# Patient Record
Sex: MALE | Race: WHITE
[De-identification: names, ages, dates, MRNs, and addresses within clinical notes are randomized per-mention and may not be internally consistent; named-entity substitution may affect disease eponyms.]

---

## 2017-08-22 ENCOUNTER — HOSPITAL ENCOUNTER (EMERGENCY)
Dept: HOSPITAL 69 - ER | Age: 32
Discharge: HOME | End: 2017-08-22
Payer: SELF-PAY

## 2017-08-22 VITALS — SYSTOLIC BLOOD PRESSURE: 127 MMHG | DIASTOLIC BLOOD PRESSURE: 57 MMHG

## 2017-08-22 DIAGNOSIS — B88.0: Primary | ICD-10-CM

## 2017-08-22 NOTE — ERNOTE
Integumentary HPI





- Narrative


Date of Service: 08/22/17





- General


Time Seen by Provider: 08/22/17 19:10





- Immun/Allergies/Home Medications


Immunizations: 





 IMMUNIZATION HX





Immunizations Up to Date         Yes


History of Influenza Vaccine     No


Hx Pneumococcal Vaccination      No








Allergies/Adverse Reactions: 


 Allergies











Allergy/AdvReac Type Severity Reaction Status Date / Time


 


No Known Allergies Allergy   Verified 08/22/17 19:02











Home Medications: 


HOME MEDICATIONS





NK [No Home Medication]  12/08/13 [Last Taken Unknown]











- History of Present Illness


Narrative: 





Pt. comes in with c/o rash on his L hand and R wrist for two days.  Pt. denies 

any SOB, CP, swelling, fever, rhinorrhea, sore throat, any pain but states that 

the rash itches.  Pt. denies any alleviating factors, aggravating factors, or 

prehospital treatment.





Review of Systems





- Review of Systems


Constitutional: Present: no symptoms reported


EYE: Present: no symptoms reported


ENT: Present: no symptoms reported


Respiratory: Present: no symptoms reported.  Absent: shortness of breath, cough

, wheezing


Cardiology: Present: no symptoms reported.  Absent: chest pain, palpitations, 

edema


Musculoskeletal: Present: no symptoms reported.  Absent: back pain, joint pain


Skin: Present: rash


Neurological: Present: no symptoms reported.  Absent: headache, dizziness/light-

headedness, numbness, tingling


All Other Systems: All systems neg except as marked





- Patient's Past Medical History


Patient History - Medical: No pertinent hx


Patient History - Cardiac/Respiratory: No pertinent hx


Patient History - Cancer: No Hx of Cancer


Patient History - Surgical Procedures: No surgical history


Patient History - Other: None





- Family History


  ** Father


Family History - Medical: Diabetes Type 2 Insulin Dependent


Family History - Cardiac/Respiratory: No pertinent hx


Family History - Cancer: No pertinent family hx





- Social History


Living Situations: home


Abuse History: No History of abuse


Psych History: No pertinent hx


Smoking Status: Never smoker


Have you smoked in the past 12 months: No


Do you dip or chew tobacco: No


Patient requests Smoking Cessation Consult: No


Initiate information on Smoking Cessation: No


Alcohol Use: none


Drug Use: none





- Immunizations


Immunizations Up to Date: Yes


Hx Pneumococcal Vaccination: No


History of Influenza Vaccine: No





Physical Exam





- Physical Exam


General Appearance: Present: wd/wn, alert, no apparent distress


Head Exam: Present: normal inspection, no evidence of injury


Eye Exam: Normal inspection: bilateral, PERRL: bilateral, EOMI: bilateral


Ears, Nose, Throat: Present: normal ENT inspection, normal pharynx


Respiratory: Present: no respiratory distress, normal breath sounds, no 

accessory muscle use, chest nontender, lungs clear


Cardiovascular/Chest: Present: regular rate, rhythm, no murmur, normal 

peripheral pulses


Extremity Exam: Present: non-tender, normal range of motion, no edema


Neurological Exam: Present: alert, oriented, normal mood/affect, no motor/

sensory deficits


Skin Exam: Present: normal color, warm/dry, other - two small insect bites L 

hand and Three on R wrist with mild erythema no edema and healing 





ED Progress





- Vital Signs


Patient's Vital Signs:: I have reviewed the patient's vital signs.


Vital Signs: 





 Vital Signs











  08/22/17





  19:02


 


Temperature 37.1 C


 


Pulse Rate 76


 


Respiratory 14





Rate 


 


Blood Pressure 127/57


 


O2 Sat by Pulse 99





Oximetry 














- Progress/Reassessment


Chief Complaint: Rash





Departure


Clinical Impression: 


 Chigger bites








- Departure


Disposition: Home self-care


Condition: Good


Instructions:  Insect Bite


Additional Instructions: 


Please follow up with primary provider in 2-3 days if not improved please take 

50 mg Benedryl then take 25 mg every six hours after.

## 2018-02-25 ENCOUNTER — HOSPITAL ENCOUNTER (EMERGENCY)
Dept: HOSPITAL 69 - ER | Age: 33
Discharge: HOME | End: 2018-02-25
Payer: SELF-PAY

## 2018-02-25 VITALS — DIASTOLIC BLOOD PRESSURE: 70 MMHG | SYSTOLIC BLOOD PRESSURE: 133 MMHG

## 2018-02-25 NOTE — ERNOTE
Medical Problem HPI





- Narrative


Date of Service: 02/25/18





- General


Chief Complaint: Fever


Time Seen by Provider: 02/25/18 07:01


Source: patient


Exam Limitations: no limitations





- Immun/Allergies/Home Medications


Immunizations: 





 IMMUNIZATION HX





Immunizations Up to Date         Yes


History of Influenza Vaccine     No


Hx Pneumococcal Vaccination      No








Allergies/Adverse Reactions: 


Allergies





No Known Allergies Allergy (Verified 02/25/18 06:58)


 








Home Medications: 


HOME MEDICATIONS





Doxycycline Hyclate [Vibratab] 100 mg PO BID #20 tab 02/25/18 [Last Taken 

Unknown]











- History of Present History


Narrative: 





c/o fever and sinus pain


Timing: constant, getting worse


Severity: moderate


Modifying Factors - (Improves): Present: other - nothing


Modifying Factors - (Worsens): Present: other - nothing





Review of Systems





- Narrative


Narrative: 





unremarkable





- Review of Systems


Constitutional: Present: See HPI, weakness, fatigue, malaise


EYE: Present: no symptoms reported


ENT: Present: See HPI, nose pain, nose congestion, nasal drainage, other - left 

sinus pain


Respiratory: Present: no symptoms reported


Cardiology: Present: no symptoms reported


Gastrointestinal/Abdominal: Present: no symptoms reported


Genitourinary: Present: no symptoms reported


Musculoskeletal: Present: no symptoms reported


Skin: Present: no symptoms reported


Neurological: Present: no symptoms reported


Endocrine: Present: no symptoms reported


Hematologic/Lymphatic: Present: no symptoms reported





- Narrative


Narrative: 





unremarkable





- Patient's Past Medical History


Patient History - Medical: No pertinent hx


Patient History - Cardiac/Respiratory: No pertinent hx


Patient History - Cancer: No Hx of Cancer


Patient History - Surgical Procedures: No surgical history


Patient History - Other: None





- Family History


  ** Father


Family History - Medical: Diabetes Type 2 Insulin Dependent


Family History - Cardiac/Respiratory: No pertinent hx


Family History - Cancer: No pertinent family hx





- Social History


Living Situations: home


Abuse History: No History of abuse


Psych History: No pertinent hx


Does anyone smoke in the home?: No


Smoking Status: Never smoker


Have you smoked in the past 12 months: No


Do you dip or chew tobacco: No


Patient requests Smoking Cessation Consult: No


Initiate information on Smoking Cessation: No


Alcohol Use: none


Drug Use: none





- Immunizations


Immunizations Up to Date: Yes


Hx Pneumococcal Vaccination: No


History of Influenza Vaccine: No





Physical Exam





- Physical Exam


General Appearance: Present: mild distress, anxious


Head Exam: Present: normal inspection, no evidence of injury


Eye Exam: Normal inspection: bilateral, PERRL: bilateral, EOMI: bilateral


Ears, Nose, Throat: Present: nasal congestion, sinus pain/drainage


Neck: Present: normal inspection, nontender


Respiratory: Present: no respiratory distress, normal breath sounds, no 

accessory muscle use, chest nontender, lungs clear


Cardiovascular/Chest: Present: regular rate, rhythm, no murmur, normal 

peripheral pulses


Gastrointestinal/Abdominal: Present: normal bowel sounds, nontender, 

nondistended, soft, no organomegaly


Back Exam: Present: normal inspection, normal range of motion, no CVA tenderness

, no vertebral tenderness


Extremity Exam: Present: normal inspection, non-tender


Neurological Exam: Present: alert, oriented, normal mood/affect, no motor/

sensory deficits


Skin Exam: Present: normal color, warm/dry


Lymphatic Exam: Present: no adenopathy





ED Progress





- Date and Time Seen:


Date and Time: 





02/25/18 07:07


unchanged





- Vital Signs


Patient's Vital Signs:: I have reviewed the patient's vital signs.


Vital Signs: 





 Vital Signs











  02/25/18





  06:55


 


Temperature 39.3 C H


 


Pulse Rate 117 H


 


Respiratory 12





Rate 


 


Blood Pressure 133/70


 


O2 Sat by Pulse 99





Oximetry 














- Progress/Reassessment


Chief Complaint: Fever


Progress:: Unchanged





- Transfer of Care


Expected Disposition: Discharge





Plan





- Plan


Plan: 





to be discharged





Departure


Clinical Impression: 


 Sinusitis, acute








- Departure


Disposition: Home Follow Up Needed


Condition: Fair


Instructions:  Sinusitis, Adult, Easy-to-Read


Prescriptions: 


Doxycycline Hyclate [Vibratab] 100 mg PO BID #20 tab